# Patient Record
Sex: FEMALE | Race: OTHER | NOT HISPANIC OR LATINO | URBAN - METROPOLITAN AREA
[De-identification: names, ages, dates, MRNs, and addresses within clinical notes are randomized per-mention and may not be internally consistent; named-entity substitution may affect disease eponyms.]

---

## 2020-10-15 ENCOUNTER — EMERGENCY (EMERGENCY)
Facility: HOSPITAL | Age: 50
LOS: 1 days | Discharge: HOME | End: 2020-10-15
Attending: EMERGENCY MEDICINE | Admitting: EMERGENCY MEDICINE
Payer: MEDICAID

## 2020-10-15 VITALS
DIASTOLIC BLOOD PRESSURE: 89 MMHG | HEART RATE: 84 BPM | SYSTOLIC BLOOD PRESSURE: 134 MMHG | OXYGEN SATURATION: 100 % | RESPIRATION RATE: 16 BRPM | TEMPERATURE: 98 F

## 2020-10-15 VITALS
SYSTOLIC BLOOD PRESSURE: 165 MMHG | RESPIRATION RATE: 18 BRPM | TEMPERATURE: 98 F | DIASTOLIC BLOOD PRESSURE: 106 MMHG | HEART RATE: 88 BPM | OXYGEN SATURATION: 96 %

## 2020-10-15 DIAGNOSIS — Z98.890 OTHER SPECIFIED POSTPROCEDURAL STATES: Chronic | ICD-10-CM

## 2020-10-15 LAB
ALBUMIN SERPL ELPH-MCNC: 3.7 G/DL — SIGNIFICANT CHANGE UP (ref 3.3–5)
ALP SERPL-CCNC: 60 U/L — SIGNIFICANT CHANGE UP (ref 40–120)
ALT FLD-CCNC: 18 U/L — SIGNIFICANT CHANGE UP (ref 12–78)
ANION GAP SERPL CALC-SCNC: 7 MMOL/L — SIGNIFICANT CHANGE UP (ref 5–17)
AST SERPL-CCNC: 21 U/L — SIGNIFICANT CHANGE UP (ref 15–37)
BASOPHILS # BLD AUTO: 0.03 K/UL — SIGNIFICANT CHANGE UP (ref 0–0.2)
BASOPHILS NFR BLD AUTO: 0.5 % — SIGNIFICANT CHANGE UP (ref 0–2)
BILIRUB SERPL-MCNC: 0.3 MG/DL — SIGNIFICANT CHANGE UP (ref 0.2–1.2)
BUN SERPL-MCNC: 11 MG/DL — SIGNIFICANT CHANGE UP (ref 7–23)
CALCIUM SERPL-MCNC: 8.2 MG/DL — LOW (ref 8.5–10.1)
CHLORIDE SERPL-SCNC: 104 MMOL/L — SIGNIFICANT CHANGE UP (ref 96–108)
CO2 SERPL-SCNC: 27 MMOL/L — SIGNIFICANT CHANGE UP (ref 22–31)
CREAT SERPL-MCNC: 0.57 MG/DL — SIGNIFICANT CHANGE UP (ref 0.5–1.3)
EOSINOPHIL # BLD AUTO: 0.01 K/UL — SIGNIFICANT CHANGE UP (ref 0–0.5)
EOSINOPHIL NFR BLD AUTO: 0.2 % — SIGNIFICANT CHANGE UP (ref 0–6)
GLUCOSE SERPL-MCNC: 87 MG/DL — SIGNIFICANT CHANGE UP (ref 70–99)
HCG SERPL-ACNC: <1 MIU/ML — SIGNIFICANT CHANGE UP
HCT VFR BLD CALC: 27.9 % — LOW (ref 34.5–45)
HGB BLD-MCNC: 8.9 G/DL — LOW (ref 11.5–15.5)
IMM GRANULOCYTES NFR BLD AUTO: 0.3 % — SIGNIFICANT CHANGE UP (ref 0–1.5)
LYMPHOCYTES # BLD AUTO: 1.34 K/UL — SIGNIFICANT CHANGE UP (ref 1–3.3)
LYMPHOCYTES # BLD AUTO: 21 % — SIGNIFICANT CHANGE UP (ref 13–44)
MCHC RBC-ENTMCNC: 21 PG — LOW (ref 27–34)
MCHC RBC-ENTMCNC: 31.9 GM/DL — LOW (ref 32–36)
MCV RBC AUTO: 65.8 FL — LOW (ref 80–100)
MONOCYTES # BLD AUTO: 0.34 K/UL — SIGNIFICANT CHANGE UP (ref 0–0.9)
MONOCYTES NFR BLD AUTO: 5.3 % — SIGNIFICANT CHANGE UP (ref 2–14)
NEUTROPHILS # BLD AUTO: 4.63 K/UL — SIGNIFICANT CHANGE UP (ref 1.8–7.4)
NEUTROPHILS NFR BLD AUTO: 72.7 % — SIGNIFICANT CHANGE UP (ref 43–77)
NRBC # BLD: 0 /100 WBCS — SIGNIFICANT CHANGE UP (ref 0–0)
PLATELET # BLD AUTO: 294 K/UL — SIGNIFICANT CHANGE UP (ref 150–400)
POTASSIUM SERPL-MCNC: 3.7 MMOL/L — SIGNIFICANT CHANGE UP (ref 3.5–5.3)
POTASSIUM SERPL-SCNC: 3.7 MMOL/L — SIGNIFICANT CHANGE UP (ref 3.5–5.3)
PROT SERPL-MCNC: 8.2 G/DL — SIGNIFICANT CHANGE UP (ref 6–8.3)
RBC # BLD: 4.24 M/UL — SIGNIFICANT CHANGE UP (ref 3.8–5.2)
RBC # FLD: 17.9 % — HIGH (ref 10.3–14.5)
SODIUM SERPL-SCNC: 138 MMOL/L — SIGNIFICANT CHANGE UP (ref 135–145)
TROPONIN I SERPL-MCNC: <.015 NG/ML — SIGNIFICANT CHANGE UP (ref 0.01–0.04)
TSH SERPL-MCNC: 1.7 UIU/ML — SIGNIFICANT CHANGE UP (ref 0.36–3.74)
WBC # BLD: 6.37 K/UL — SIGNIFICANT CHANGE UP (ref 3.8–10.5)
WBC # FLD AUTO: 6.37 K/UL — SIGNIFICANT CHANGE UP (ref 3.8–10.5)

## 2020-10-15 PROCEDURE — 70450 CT HEAD/BRAIN W/O DYE: CPT

## 2020-10-15 PROCEDURE — 84443 ASSAY THYROID STIM HORMONE: CPT

## 2020-10-15 PROCEDURE — 84484 ASSAY OF TROPONIN QUANT: CPT

## 2020-10-15 PROCEDURE — 99284 EMERGENCY DEPT VISIT MOD MDM: CPT

## 2020-10-15 PROCEDURE — 93005 ELECTROCARDIOGRAM TRACING: CPT

## 2020-10-15 PROCEDURE — 85025 COMPLETE CBC W/AUTO DIFF WBC: CPT

## 2020-10-15 PROCEDURE — 36415 COLL VENOUS BLD VENIPUNCTURE: CPT

## 2020-10-15 PROCEDURE — 82962 GLUCOSE BLOOD TEST: CPT

## 2020-10-15 PROCEDURE — 96361 HYDRATE IV INFUSION ADD-ON: CPT

## 2020-10-15 PROCEDURE — 93010 ELECTROCARDIOGRAM REPORT: CPT

## 2020-10-15 PROCEDURE — 70450 CT HEAD/BRAIN W/O DYE: CPT | Mod: 26

## 2020-10-15 PROCEDURE — 96360 HYDRATION IV INFUSION INIT: CPT

## 2020-10-15 PROCEDURE — 80053 COMPREHEN METABOLIC PANEL: CPT

## 2020-10-15 PROCEDURE — 99284 EMERGENCY DEPT VISIT MOD MDM: CPT | Mod: 25

## 2020-10-15 PROCEDURE — 84702 CHORIONIC GONADOTROPIN TEST: CPT

## 2020-10-15 RX ORDER — SODIUM CHLORIDE 9 MG/ML
1000 INJECTION INTRAMUSCULAR; INTRAVENOUS; SUBCUTANEOUS ONCE
Refills: 0 | Status: COMPLETED | OUTPATIENT
Start: 2020-10-15 | End: 2020-10-15

## 2020-10-15 RX ORDER — FERROUS SULFATE 325(65) MG
325 TABLET ORAL ONCE
Refills: 0 | Status: COMPLETED | OUTPATIENT
Start: 2020-10-15 | End: 2020-10-15

## 2020-10-15 RX ADMIN — SODIUM CHLORIDE 2000 MILLILITER(S): 9 INJECTION INTRAMUSCULAR; INTRAVENOUS; SUBCUTANEOUS at 14:02

## 2020-10-15 RX ADMIN — Medication 325 MILLIGRAM(S): at 16:21

## 2020-10-15 RX ADMIN — SODIUM CHLORIDE 2000 MILLILITER(S): 9 INJECTION INTRAMUSCULAR; INTRAVENOUS; SUBCUTANEOUS at 17:34

## 2020-10-15 RX ADMIN — SODIUM CHLORIDE 1000 MILLILITER(S): 9 INJECTION INTRAMUSCULAR; INTRAVENOUS; SUBCUTANEOUS at 15:00

## 2020-10-15 RX ADMIN — SODIUM CHLORIDE 1000 MILLILITER(S): 9 INJECTION INTRAMUSCULAR; INTRAVENOUS; SUBCUTANEOUS at 18:35

## 2020-10-15 NOTE — ED PROVIDER NOTE - NEUROLOGICAL, MLM
Alert and oriented, no focal deficits, no motor or sensory deficits. CN II-XII grossly intact, normal finger to nose.

## 2020-10-15 NOTE — ED ADULT NURSE NOTE - RESPIRATION RHYTHM, QM
Patient scheduled for loop insert on 7-31-20 at [facility] with Dr. Palacios.   
Result Notes for Holter Monitor / Event Recorder   Notes recorded by Goran Partida M.D. on 7/22/2020 at 6:45 AM PDT   Cardiac monitor not showing AF. As per our discussion- I suggest obtaining a loop recorder.      Called pt and discussed monitor results that did not show Afib, and discussed next step of loop recorder. This was discussed during her BE appt. She has no questions at this time and is comfortable proceeding. Order placed.     To Daniella for scheduling   
regular

## 2020-10-15 NOTE — ED PROVIDER NOTE - ATTENDING CONTRIBUTION TO CARE
49 year old female with syncope, no seizure activity reported, will check labs, ekg, ct head and cardiac monitoring and reassess. By history sounds vasovagal.

## 2020-10-15 NOTE — ED PROVIDER NOTE - PROGRESS NOTE DETAILS
pt cleared for d/c by cards. d/c and fu cardiology next week.  keep hydrated.  return for worsening symptoms.

## 2020-10-15 NOTE — ED ADULT NURSE NOTE - CHPI ED NUR SYMPTOMS NEG
no diaphoresis/no nausea/no back pain/no congestion/no chest pain/no dizziness/no fever/no vomiting/no shortness of breath/no chills

## 2020-10-15 NOTE — CONSULT NOTE ADULT - ASSESSMENT
48 yo F with a PMH of HTN who was BIBEMS this afternoon complaining of syncope today at work.    IMPRESSION: Doubt ACS, pain likely psychogenic or hematological. EKG shows NSR.     RECOMMENDATIONS 48 yo F with a PMH of HTN who was BIBEMS this afternoon complaining of syncope today at work.    IMPRESSION: Doubt ACS, pain likely psychogenic or hematological. EKG shows NSR. CT head shows no acute findings.    RECOMMENDATIONS: 48 yo F with a PMH of HTN who was BIBEMS this afternoon complaining of syncope today at work.    IMPRESSION: Doubt ACS, pain likely psychogenic or hematological. Follow up with orthostatics. EKG shows NSR. CT head shows no acute findings. PT is hemodynamically stable.     RECOMMENDATIONS: 48 yo F with a PMH of HTN who was BIBEMS this afternoon complaining of syncope today at work.    IMPRESSION:   Patient presenting after having loss of consciousness at work, denies hitting her head. Now admitting to feeling heart pounding, no tachycardia demonstrated. Previous episodes of syncope in the past after having high blood pressure.  BP on presentation to the ED was 165/106. CT head negative. Follow up with orthostatics. EKG shows NSR. CT head shows no acute findings. PT is hemodynamically stable.     RECOMMENDATIONS:  - Orthostatics   48 yo F with a PMH of HTN who was BIBEMS this afternoon complaining of syncope today at work.    IMPRESSION:   Patient presenting after having loss of consciousness at work, denies hitting her head. Now admitting to feeling heart pounding, no tachycardia demonstrated. Previous episodes of syncope in the past after having high blood pressure.  BP on presentation to the ED was 165/106. POCT glucose 106. CT head negative. Follow up with orthostatics. EKG shows NSR. CT head shows no acute findings. PT is hemodynamically stable.     RECOMMENDATIONS:  - Orthostatics   50 yo F with a PMH of HTN who was BIBEMS this afternoon complaining of syncope today at work.    IMPRESSION:   Patient presenting after having loss of consciousness at work after prolonged standing and walking, was helped down to the floor by her coworker and denies hitting her head. Now admitting to feeling heart pounding, no tachycardia demonstrated. Previous episodes of syncope in the past allegedly caused by high blood pressure according to the patient. Orthostatics negative. BP on presentation to the ED was 165/106. POCT glucose 106. CT head negative. EKG shows NSR. CT head shows no acute findings. PT is hemodynamically stable. Giving presentation, etiology of patient's syncope likely vasovagal.     RECOMMENDATIONS:  - Safe to discharge home from cardio perspective  - Instructed patient to drink more water and take frequent breaks from standing at work  - Pt to follow up with her cardiologist as an outpatient

## 2020-10-15 NOTE — ED PROVIDER NOTE - CROS ED NEURO POS
CHANGE IN LEVEL OF CONSCIOUSNESS/weakness in b/l UE and LE/HEADACHE HEADACHE/CHANGE IN LEVEL OF CONSCIOUSNESS

## 2020-10-15 NOTE — ED PROVIDER NOTE - CARE PROVIDER_API CALL
Edgar Rose)  Cardiovascular Disease  [address] [place] [address]  Phone: [phone]  Fax: [phone]  Follow Up Time: 7-10 Days

## 2020-10-15 NOTE — ED PROVIDER NOTE - PATIENT PORTAL LINK FT
You can access the FollowMyHealth Patient Portal offered by [facility] by registering at the following website: [email or web address]. By joining [facility]’s FollowMyHealth portal, you will also be able to view your health information using other applications (apps) compatible with our system.

## 2020-10-15 NOTE — ED PROVIDER NOTE - NSFOLLOWUPINSTRUCTIONS_ED_ALL_ED_FT
drink lots of fluids, return for chest pain, fever, recurrent syncope, uncontrolled vomiting,  call dr naranjo for follow  up in one to two weeks.

## 2020-10-15 NOTE — CONSULT NOTE ADULT - SUBJECTIVE AND OBJECTIVE BOX
Patient is a 49y old  Female who presents with a chief complaint of     HPI:      PAST MEDICAL & SURGICAL HISTORY:  Hypertension    H/O right inguinal hernia repair              ECHO  FINDINGS:      MEDICATIONS  (STANDING):  ferrous    sulfate 325 milliGRAM(s) Oral Once    MEDICATIONS  (PRN):      FAMILY HISTORY:  Family history of hypertension (Mother)      Denies Family history of CAD or early MI      Constitutional: denies fever, chills  HEENT: denies blurry vision, difficulty hearing  Respiratory: denies SOB, CROCKER, cough  Cardiovascular: denies CP, palpitations, orthopnea, PND, LE edema  Gastrointestinal: denies nausea, vomiting, abdominal pain  Genitourinary: denies urinary changes  Skin: Denies rashes, itching  Neurologic: denies headache, weakness, dizziness  Hematology/Oncology: denies bleeding, easy bruising  ROS negative except as noted above      SOCIAL HISTORY:    No tobacco, Alcohol or Ddrug use    Vital Signs Last 24 Hrs  T(C): 36.7 (15 Oct 2020 12:53), Max: 36.7 (15 Oct 2020 12:53)  T(F): 98.1 (15 Oct 2020 12:53), Max: 98.1 (15 Oct 2020 12:53)  HR: 78 (15 Oct 2020 15:00) (72 - 88)  BP: 142/81 (15 Oct 2020 15:00) (142/81 - 165/106)  BP(mean): --  RR: 16 (15 Oct 2020 15:00) (16 - 18)  SpO2: 100% (15 Oct 2020 15:00) (96% - 100%)    Physical Exam:  General: Well developed, well nourished, NAD  HEENT: NCAT, PERRLA, EOMI bl, moist mucous membranes   Neck: Supple, nontender, no mass  Neurology: A&Ox3, nonfocal, sensation intact   Respiratory: CTA B/L, No W/R/R  CV: RRR, +S1/S2, no murmurs, rubs or gallops  Abdominal: Soft, NT, ND +BSx4, no palpable masses  Extremities: No C/C/E, + peripheral pulses  MSK: Normal ROM, no joint erythema or warmth, no joint swelling   Heme: No obvious ecchymosis or petechiae   Skin: warm, dry, normal color      ECG:    I&O's Detail      LABS:                        8.9    6.37  )-----------( 294      ( 15 Oct 2020 14:16 )             27.9     10-15    138  |  104  |  11  ----------------------------<  87  3.7   |  27  |  0.57    Ca    8.2<L>      15 Oct 2020 14:16    TPro  8.2  /  Alb  3.7  /  TBili  0.3  /  DBili  x   /  AST  21  /  ALT  18  /  AlkPhos  60  10-15    CARDIAC MARKERS ( 15 Oct 2020 14:16 )  <.015 ng/mL / x     / x     / x     / x              I&O's Summary    BNP  RADIOLOGY & ADDITIONAL STUDIES: Patient is a 49y old  Female who presents with a chief complaint of dizziness.    [employer] interpreters Ursula ID # [id number] for [place] speaking patient    HPI: 48 yo F with a PMH of HTN who was BIBEMS this afternoon complaining of syncope today at work. Her last episode of syncope was 4 years ago. She reports LOC and claims she did not hit her head. This morning as she was heading to work she felt dizzy and vomited. This past week she has been feeling tired and generally weak, she reports numbness in her hands and feet. She took her BP medication this morning. Patient states she has had recent stressors both at work and in her personal life. Patient did not want to explain what the stressors are. Patient admits fatigue, lightheadedness, dizziness, headache,  palpitations, nausea, vomiting and numbness/tingling in her fingers and toes. Patient also feels anxious and depressed. She has no SI or HI. Patient denies fever, nasal congestion, throat pain, chest pain, abdominal pain or diarrhea    INTERVAL EVENTS/HPI: Patient examined at bedside by cardiology team. Pt states that this past week that she has not been feeling well. Admits to nausea and vomited once this morning. Once at work she felt dizzy at states she was doing an uneventful task when she LOC. Pt denies syncope prior to the episode and "just blacked out". Pt states she came in and out of consciousness three times during whole event. Admits to this happening previously 4 years ago and attributed that event to her high blood pressure. Pt admits to palpitations, anxiety, SOB, b/l numbness in fingers and toes.  Pt states she can only walk one block before being short of breath. Admits to dizziness upon getting up from a chair multiple times a week. Pt is unsure of timeline for these symptoms.     Pt last saw cardiologist 2 months ago but can not recall his/her name. States she has gotten multiple tests done but cannot recall names of tests or results. Pt denies any PMHx of MI, stroke, CHF, DM.     PAST MEDICAL & SURGICAL HISTORY:  Hypertension    H/O right inguinal hernia repair      MEDICATIONS  (STANDING):  ferrous    sulfate 325 milliGRAM(s) Oral Once    MEDICATIONS  (HOME):  Enalapril 2x a day    FAMILY HISTORY:  Family history of hypertension (Mother)      Denies Family history of CAD or early MI    ROS:  Constitutional: denies fever, chills  HEENT: denies blurry vision  Respiratory: admits SOB, CROCKER; denies cough  Cardiovascular: admits palpitations, orthopnea; denies CP, LE edema  Gastrointestinal: admits nausea, vomiting; denies abdominal pain  Neurologic: admits weakness, dizziness;  denies headache  ROS negative except as noted above    SOCIAL HISTORY:    No tobacco, Alcohol or Drug use    Vital Signs Last 24 Hrs  T(C): 36.7 (15 Oct 2020 12:53), Max: 36.7 (15 Oct 2020 12:53)  T(F): 98.1 (15 Oct 2020 12:53), Max: 98.1 (15 Oct 2020 12:53)  HR: 78 (15 Oct 2020 15:00) (72 - 88)  BP: 142/81 (15 Oct 2020 15:00) (142/81 - 165/106)  RR: 16 (15 Oct 2020 15:00) (16 - 18)  SpO2: 100% (15 Oct 2020 15:00) (96% - 100%)    Physical Exam:  General: Well developed, well nourished, NAD  HEENT: NCAT, PERRL, EOMI bl  Neurology: A&Ox3, nonfocal, sensation intact   Respiratory: CTA B/L, No W/R/R  CV: RRR, +S1/S2, no murmurs, rubs or gallops  Abdominal: Soft, NT, ND +BSx4  Extremities: No C/C/E, + peripheral pulses  Heme: No obvious ecchymosis or petechiae   Skin: warm, dry, normal color      ECG:    I&O's Detail      LABS:                        8.9    6.37  )-----------( 294      ( 15 Oct 2020 14:16 )             27.9     10-15    138  |  104  |  11  ----------------------------<  87  3.7   |  27  |  0.57    Ca    8.2<L>      15 Oct 2020 14:16    TPro  8.2  /  Alb  3.7  /  TBili  0.3  /  DBili  x   /  AST  21  /  ALT  18  /  AlkPhos  60  10-15    CARDIAC MARKERS ( 15 Oct 2020 14:16 )  <.015 ng/mL / x     / x     / x     / x          I&O's Summary    BNP  RADIOLOGY & ADDITIONAL STUDIES: Patient is a 49y old  Female who presents with a chief complaint of dizziness.    [place] interpreters Ursula ID # [id number] for [place] Islander speaking patient    HPI: 50 yo F with a PMH of HTN who was BIBEMS this afternoon complaining of syncope today at work. Her last episode of syncope was 4 years ago. She reports LOC and claims she did not hit her head. This morning as she was heading to work she felt dizzy and vomited. This past week she has been feeling tired and generally weak, she reports numbness in her hands and feet. She took her BP medication this morning. Patient states she has had recent stressors both at work and in her personal life. Patient did not want to explain what the stressors are. Patient admits fatigue, lightheadedness, dizziness, headache,  palpitations, nausea, vomiting and numbness/tingling in her fingers and toes. Patient also feels anxious and depressed. She has no SI or HI. Patient denies fever, nasal congestion, throat pain, chest pain, abdominal pain or diarrhea    INTERVAL EVENTS/HPI: Patient examined at bedside by cardiology team. Pt states that this past week that she has not been feeling well. Admits to nausea and vomited once this morning. Pt states she took HBP medication and went to work. Once at work she felt dizzy at states she was doing an uneventful task when she LOC. Pt denies syncope prior to the episode and "just blacked out". Pt states she came in and out of consciousness three times during whole event. Admits to this happening previously 4 years ago and attributed that event to her high blood pressure. Pt admits to palpitations, anxiety, SOB, b/l numbness in fingers and toes.  Pt states she can only walk one block before being short of breath. Admits to dizziness upon getting up from a chair multiple times a week. Pt is unsure of timeline for these symptoms.     Pt last saw cardiologist 2 months ago but can not recall his/her name. States she has gotten multiple tests done but cannot recall names of tests or results. Pt denies any PMHx of MI, stroke, CHF, DM.     PAST MEDICAL & SURGICAL HISTORY:  Hypertension    H/O right inguinal hernia repair      MEDICATIONS  (STANDING):  ferrous    sulfate 325 milliGRAM(s) Oral Once    MEDICATIONS  (HOME):  Enalapril 2x a day    FAMILY HISTORY:  Family history of hypertension (Mother)      Denies Family history of CAD or early MI    ROS:  Constitutional: denies fever, chills  HEENT: denies blurry vision  Respiratory: admits SOB, CROCKER; denies cough  Cardiovascular: admits palpitations, orthopnea; denies CP, LE edema  Gastrointestinal: admits nausea, vomiting; denies abdominal pain  Neurologic: admits weakness, dizziness;  denies headache  ROS negative except as noted above    SOCIAL HISTORY:    No tobacco, Alcohol or Drug use    Vital Signs Last 24 Hrs  T(C): 36.7 (15 Oct 2020 12:53), Max: 36.7 (15 Oct 2020 12:53)  T(F): 98.1 (15 Oct 2020 12:53), Max: 98.1 (15 Oct 2020 12:53)  HR: 78 (15 Oct 2020 15:00) (72 - 88)  BP: 142/81 (15 Oct 2020 15:00) (142/81 - 165/106)  RR: 16 (15 Oct 2020 15:00) (16 - 18)  SpO2: 100% (15 Oct 2020 15:00) (96% - 100%)    Physical Exam:  General: Well developed, well nourished, NAD  HEENT: NCAT, PERRL, EOMI bl  Neurology: A&Ox3, nonfocal, sensation intact   Respiratory: CTA B/L, No W/R/R  CV: RRR, +S1/S2, no murmurs, rubs or gallops  Abdominal: Soft, NT, ND +BSx4  Extremities: No C/C/E, + peripheral pulses  Heme: No obvious ecchymosis or petechiae   Skin: warm, dry, normal color      ECG: NSR vent. rate 74 bpm    I&O's Detail      LABS:                        8.9    6.37  )-----------( 294      ( 15 Oct 2020 14:16 )             27.9     10-15    138  |  104  |  11  ----------------------------<  87  3.7   |  27  |  0.57    Ca    8.2<L>      15 Oct 2020 14:16    TPro  8.2  /  Alb  3.7  /  TBili  0.3  /  DBili  x   /  AST  21  /  ALT  18  /  AlkPhos  60  10-15    CARDIAC MARKERS ( 15 Oct 2020 14:16 )  <.015 ng/mL / x     / x     / x     / x          I&O's Summary    BNP  RADIOLOGY & ADDITIONAL STUDIES:   CT Head: no acute intracranial findings Patient is a 49y old  Female who presents with a chief complaint of dizziness.    [employer] interpreters Ursula ID # [id number] for [place] speaking patient    HPI: 50 yo F with a PMH of HTN who was BIBEMS this afternoon complaining of syncope today at work. Her last episode of syncope was 4 years ago. She reports LOC and claims she did not hit her head. This morning as she was heading to work she felt dizzy and vomited. This past week she has been feeling tired and generally weak, she reports numbness in her hands and feet. She took her BP medication this morning. Patient states she has had recent stressors both at work and in her personal life. Patient did not want to explain what the stressors are. Patient admits fatigue, lightheadedness, dizziness, headache,  palpitations, nausea, vomiting and numbness/tingling in her fingers and toes. Patient also feels anxious and depressed. She has no SI or HI. Patient denies fever, nasal congestion, throat pain, chest pain, abdominal pain or diarrhea    INTERVAL EVENTS/HPI: Patient examined at bedside by cardiology team. Pt states that this past week that she has not been feeling well. Admits to nausea and vomited once this morning. Pt states she took HBP medication and went to work. Once at work she felt dizzy at states she was doing an uneventful task when she LOC. Pt denies syncope prior to the episode and "just blacked out". Pt states she came in and out of consciousness three times during whole event. Admits to this happening previously 4 years ago and attributed that event to her high blood pressure. Pt admits to palpitations, anxiety, CROCKER, b/l numbness in fingers and toes.  Pt states she can only walk one block before being short of breath. Admits to dizziness upon getting up from a chair multiple times a week. Pt is unsure of timeline for these symptoms.     Pt last saw cardiologist 2 months ago but can not recall his/her name. States she has gotten multiple tests done but cannot recall names of tests or results. Pt denies any PMHx of MI, stroke, CHF, DM.     PAST MEDICAL & SURGICAL HISTORY:  Hypertension    H/O right inguinal hernia repair      MEDICATIONS  (STANDING):  ferrous    sulfate 325 milliGRAM(s) Oral Once    MEDICATIONS  (HOME):  Enalapril 2x a day    FAMILY HISTORY:  Family history of hypertension (Mother)      Denies Family history of CAD or early MI    ROS:  Constitutional: denies fever, chills  HEENT: denies blurry vision  Respiratory: admits CROCKER; denies cough  Cardiovascular: admits palpitations, orthopnea; denies CP, LE edema  Gastrointestinal: admits nausea, vomiting; denies abdominal pain  Neurologic: admits weakness, dizziness;  denies headache  ROS negative except as noted above    SOCIAL HISTORY:    No tobacco, Alcohol or Drug use    Vital Signs Last 24 Hrs  T(C): 36.7 (15 Oct 2020 12:53), Max: 36.7 (15 Oct 2020 12:53)  T(F): 98.1 (15 Oct 2020 12:53), Max: 98.1 (15 Oct 2020 12:53)  HR: 78 (15 Oct 2020 15:00) (72 - 88)  BP: 142/81 (15 Oct 2020 15:00) (142/81 - 165/106)  RR: 16 (15 Oct 2020 15:00) (16 - 18)  SpO2: 100% (15 Oct 2020 15:00) (96% - 100%)    Physical Exam:  General: Well developed, well nourished, NAD  HEENT: NCAT, PERRL, EOMI bl  Neurology: A&Ox3, nonfocal, sensation intact   Respiratory: CTA B/L, No W/R/R  CV: RRR, +S1/S2, no murmurs, rubs or gallops  Abdominal: Soft, NT, ND +BSx4  Extremities: No C/C/E, + peripheral pulses  Heme: No obvious ecchymosis or petechiae   Skin: warm, dry, normal color      ECG: NSR vent. rate 74 bpm    I&O's Detail      LABS:                        8.9    6.37  )-----------( 294      ( 15 Oct 2020 14:16 )             27.9     10-15    138  |  104  |  11  ----------------------------<  87  3.7   |  27  |  0.57    Ca    8.2<L>      15 Oct 2020 14:16    TPro  8.2  /  Alb  3.7  /  TBili  0.3  /  DBili  x   /  AST  21  /  ALT  18  /  AlkPhos  60  10-15    CARDIAC MARKERS ( 15 Oct 2020 14:16 )  <.015 ng/mL / x     / x     / x     / x          I&O's Summary    BNP  RADIOLOGY & ADDITIONAL STUDIES:   CT Head: no acute intracranial findings Patient is a 49y old  Female who presents with a chief complaint of dizziness.    [place] interpreters Ursula ID # [id number] for [place] speaking patient    FROM ED NOTE:  HPI: 48 yo F with a PMH of HTN who was BIBEMS this afternoon complaining of syncope today at work. Her last episode of syncope was 4 years ago. She reports LOC and claims she did not hit her head. This morning as she was heading to work she felt dizzy and vomited. This past week she has been feeling tired and generally weak, she reports numbness in her hands and feet. She took her BP medication this morning. Patient states she has had recent stressors both at work and in her personal life. Patient did not want to explain what the stressors are. Patient admits fatigue, lightheadedness, dizziness, headache,  palpitations, nausea, vomiting and numbness/tingling in her fingers and toes. Patient also feels anxious and depressed. She has no SI or HI. Patient denies fever, nasal congestion, throat pain, chest pain, abdominal pain or diarrhea    INTERVAL EVENTS/HPI: Patient examined at bedside by cardiology team. Pt states that this past week that she has not been feeling well. Admits to nausea and vomited once this morning. Pt states she took blood pressure medication and went to work. Once at work she felt dizzy and states she was doing an uneventful task when she lost consciousness. Pt denies room spinning prior to the episode and "just blacked out". Pt states she came in and out of consciousness three times during whole event. Admits to this happening previously 4 years ago and attributed that event to her high blood pressure. Pt admits to palpitations, anxiety, CROCKER, b/l numbness in fingers and toes.  Pt states she can only walk one block before being short of breath. Admits to dizziness upon getting up from a chair multiple times a week. Pt is unsure of timeline for these symptoms.     Pt last saw cardiologist 2 months ago but can not recall his/her name. States she has gotten multiple tests done but cannot recall names of tests or results. Pt denies any PMHx of MI, stroke, CHF, DM.     PAST MEDICAL & SURGICAL HISTORY:  Hypertension    H/O right inguinal hernia repair      MEDICATIONS  (STANDING):  ferrous    sulfate 325 milliGRAM(s) Oral Once    MEDICATIONS  (HOME):  Enalapril 2x a day    FAMILY HISTORY:  Family history of hypertension (Mother)      Denies Family history of CAD or early MI    ROS:  Constitutional: denies fever, chills  HEENT: denies blurry vision  Respiratory: admits CROCKER; denies cough  Cardiovascular: admits palpitations, orthopnea; denies CP, LE edema  Gastrointestinal: admits nausea, vomiting; denies abdominal pain  Neurologic: admits weakness, dizziness;  denies headache  ROS negative except as noted above    SOCIAL HISTORY:    No tobacco, Alcohol or Drug use    Vital Signs Last 24 Hrs  T(C): 36.7 (15 Oct 2020 12:53), Max: 36.7 (15 Oct 2020 12:53)  T(F): 98.1 (15 Oct 2020 12:53), Max: 98.1 (15 Oct 2020 12:53)  HR: 78 (15 Oct 2020 15:00) (72 - 88)  BP: 142/81 (15 Oct 2020 15:00) (142/81 - 165/106)  RR: 16 (15 Oct 2020 15:00) (16 - 18)  SpO2: 100% (15 Oct 2020 15:00) (96% - 100%)    Physical Exam:  General: Well developed, well nourished, NAD  HEENT: NCAT, PERRL, EOMI bl  Neurology: A&Ox3, nonfocal, sensation intact   Respiratory: CTA B/L, No W/R/R  CV: RRR, +S1/S2, no murmurs, rubs or gallops  Abdominal: Soft, NT, ND +BSx4  Extremities: No C/C/E, + peripheral pulses  Heme: No obvious ecchymosis or petechiae   Skin: warm, dry, normal color      ECG: NSR vent. rate 74 bpm    I&O's Detail      LABS:                        8.9    6.37  )-----------( 294      ( 15 Oct 2020 14:16 )             27.9     10-15    138  |  104  |  11  ----------------------------<  87  3.7   |  27  |  0.57    Ca    8.2<L>      15 Oct 2020 14:16    TPro  8.2  /  Alb  3.7  /  TBili  0.3  /  DBili  x   /  AST  21  /  ALT  18  /  AlkPhos  60  10-15    CARDIAC MARKERS ( 15 Oct 2020 14:16 )  <.015 ng/mL / x     / x     / x     / x          I&O's Summary    BNP  RADIOLOGY & ADDITIONAL STUDIES:   CT Head: no acute intracranial findings

## 2020-10-15 NOTE — ED PROVIDER NOTE - OBJECTIVE STATEMENT
Prudence Castrejon is a 48 yo F with a PMH of HTN who was BIBEMS this afternoon complaining of syncope today at work. Her last episode of syncope was 4 years ago. She reports LOC and claims she did not hit her head. This morning as she was heading to work she felt dizzy and vomited. This past week she has been feeling tired and weak in addition to having numbness in her hands and feet. She took her BP medication this morning. Patient states she has recent stressors both at work and in her personal life. Patient did not want to explain what the stressors are. Patient admits fatigue, fever, lightheadedness, dizziness, headache, blurry vision, palpitations, nausea, vomiting and numbness/tingling in her fingers and toes. Patient also feels anxious and depressed. She has no SI or HI. Patient denies fever, nasal congestion, throat pain, chest pain, abdominal pain or diarrhea. Prudence Castrejon is a 50 yo F with a PMH of HTN who was BIBEMS this afternoon complaining of syncope today at work. Her last episode of syncope was 4 years ago. She reports LOC and claims she did not hit her head. This morning as she was heading to work she felt dizzy and vomited. This past week she has been feeling tired and generally weak, she reports numbness in her hands and feet. She took her BP medication this morning. Patient states she has had recent stressors both at work and in her personal life. Patient did not want to explain what the stressors are. Patient admits fatigue, lightheadedness, dizziness, headache,  palpitations, nausea, vomiting and numbness/tingling in her fingers and toes. Patient also feels anxious and depressed. She has no SI or HI. Patient denies fever, nasal congestion, throat pain, chest pain, abdominal pain or diarrhea.

## 2021-11-09 PROBLEM — I10 ESSENTIAL (PRIMARY) HYPERTENSION: Chronic | Status: ACTIVE | Noted: 2020-10-15

## 2021-12-14 PROBLEM — Z00.00 ENCOUNTER FOR PREVENTIVE HEALTH EXAMINATION: Status: ACTIVE | Noted: 2021-12-14

## 2022-03-09 ENCOUNTER — APPOINTMENT (OUTPATIENT)
Dept: OBGYN | Facility: CLINIC | Age: 52
End: 2022-03-09
Payer: SELF-PAY

## 2022-03-09 ENCOUNTER — OUTPATIENT (OUTPATIENT)
Dept: OUTPATIENT SERVICES | Facility: HOSPITAL | Age: 52
LOS: 1 days | End: 2022-03-09
Payer: SELF-PAY

## 2022-03-09 ENCOUNTER — LABORATORY RESULT (OUTPATIENT)
Age: 52
End: 2022-03-09

## 2022-03-09 DIAGNOSIS — N76.0 ACUTE VAGINITIS: ICD-10-CM

## 2022-03-09 DIAGNOSIS — R87.619 UNSPECIFIED ABNORMAL CYTOLOGICAL FINDINGS IN SPECIMENS FROM CERVIX UTERI: ICD-10-CM

## 2022-03-09 DIAGNOSIS — Z98.890 OTHER SPECIFIED POSTPROCEDURAL STATES: Chronic | ICD-10-CM

## 2022-03-09 PROCEDURE — G0463: CPT

## 2022-03-09 PROCEDURE — 57454 BX/CURETT OF CERVIX W/SCOPE: CPT

## 2022-03-09 PROCEDURE — 57454 BX/CURETT OF CERVIX W/SCOPE: CPT | Mod: NC

## 2022-03-09 PROCEDURE — 99213 OFFICE O/P EST LOW 20 MIN: CPT | Mod: GE,25

## 2022-03-11 NOTE — END OF VISIT
[] : Resident [[email or web address]] : Present for colpo.  Multiple biopsies taken.  Hemostasis achieved w Monsels

## 2022-03-11 NOTE — PROCEDURE
[Colposcopy] : Colposcopy  [Time out performed] : Pre-procedure time out performed.  Patient's name, date of birth and procedure confirmed. [Consent Obtained] : Consent obtained [Risks] : risks [Benefits] : benefits [Alternatives] : alternatives [Patient] : patient [Infection] : infection [Bleeding] : bleeding [Allergic Reaction] : allergic reaction [LGSIL] : LGSIL [HPV High Risk] : HPV high risk [No Premedication] : no premedication [Colposcopy Adequate] : colposcopy adequate [Pap Performed] : pap performed [SCI Fully Visualized] : SCI fully visualized [ECC Performed] : ECC performed [No Abnormalities] : no abnormalities [Lesion] : lesion seen [Hemostasis Obtained] : Hemostasis obtained [Tolerated Well] : the patient tolerated the procedure well [Biopsy] : biopsy not taken [de-identified] : 3 [de-identified] : 12, 2, 6 o'clock [de-identified] : 12, 2, 6 o'clock [de-identified] : 3 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

## 2022-03-21 DIAGNOSIS — R87.619 UNSPECIFIED ABNORMAL CYTOLOGICAL FINDINGS IN SPECIMENS FROM CERVIX UTERI: ICD-10-CM

## 2022-03-23 ENCOUNTER — NON-APPOINTMENT (OUTPATIENT)
Age: 52
End: 2022-03-23